# Patient Record
Sex: FEMALE | Race: WHITE | NOT HISPANIC OR LATINO | Employment: STUDENT | URBAN - METROPOLITAN AREA
[De-identification: names, ages, dates, MRNs, and addresses within clinical notes are randomized per-mention and may not be internally consistent; named-entity substitution may affect disease eponyms.]

---
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
## 2025-01-29 ENCOUNTER — ATHLETIC TRAINING (OUTPATIENT)
Dept: SPORTS MEDICINE | Facility: OTHER | Age: 20
End: 2025-01-29

## 2025-01-29 DIAGNOSIS — M25.562 ACUTE PAIN OF LEFT KNEE: Primary | ICD-10-CM

## 2025-01-29 NOTE — PROGRESS NOTES
"Athletic Training Daily Note    Name: Awilda Villatoro  Age: 19 y.o.     Visit Diagnosis: Acute pain of left knee [M25.562]    Subjective: Ath reports to [facility] for re-evaluation of left knee secondary to falling on it at practice last night. Ath states \"It is the most amount of pain she has ever been in\".    Objective: All ligament WNL, Gait is normal, no observable swelling.no pain with patellar motions, only pain is with quad MMT.    Treatment Log:     Rosalia     "

## 2025-01-30 ENCOUNTER — ATHLETIC TRAINING (OUTPATIENT)
Dept: SPORTS MEDICINE | Facility: OTHER | Age: 20
End: 2025-01-30

## 2025-01-30 DIAGNOSIS — M25.562 ACUTE PAIN OF LEFT KNEE: Primary | ICD-10-CM

## 2025-01-30 NOTE — PROGRESS NOTES
Pt reports today for rehab for knee pain. Pt states she is feeling okay and states she feels just some pain after walking all day.     Rehab today  -bike x5 minutes  -hip flexor stretch 3x30s  -monster walks red band x3  -hip dips on step  -forward lunge on step   -running progression    Ease into px today as  she demonstrated very little pain and  no antalgic gait with exercise. Report again tomorrow for rehab as needed

## 2025-02-17 ENCOUNTER — ATHLETIC TRAINING (OUTPATIENT)
Dept: SPORTS MEDICINE | Facility: OTHER | Age: 20
End: 2025-02-17

## 2025-02-17 DIAGNOSIS — G89.29 CHRONIC RIGHT-SIDED LOW BACK PAIN, UNSPECIFIED WHETHER SCIATICA PRESENT: Primary | ICD-10-CM

## 2025-02-17 DIAGNOSIS — M54.50 CHRONIC RIGHT-SIDED LOW BACK PAIN, UNSPECIFIED WHETHER SCIATICA PRESENT: Primary | ICD-10-CM

## 2025-02-17 NOTE — PROGRESS NOTES
Pt comes in today with chronic low back pain that began last year and has gone away. 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     Rehab today  -cupping and movement (cats cows, thread needle, beltran press with cable column)

## 2025-02-24 ENCOUNTER — ATHLETIC TRAINING (OUTPATIENT)
Dept: SPORTS MEDICINE | Facility: OTHER | Age: 20
End: 2025-02-24

## 2025-02-24 DIAGNOSIS — G89.29 CHRONIC RIGHT-SIDED LOW BACK PAIN, UNSPECIFIED WHETHER SCIATICA PRESENT: Primary | ICD-10-CM

## 2025-02-24 DIAGNOSIS — M54.50 CHRONIC RIGHT-SIDED LOW BACK PAIN, UNSPECIFIED WHETHER SCIATICA PRESENT: Primary | ICD-10-CM

## 2025-02-24 NOTE — PROGRESS NOTES
Athletic Training Daily Note    Name: Awilda Villatoro  Age: 19 y.o.     Visit Diagnosis: Chronic right-sided low back pain, unspecified whether sciatica present [M54.50, G89.29]    Subjective: Ath reports to [facility] for rehab. She reports R sided low back pain after px Saturday.     Objective: No palpable pain. No reports of sciatic symptoms. Previous cupping ania from over 1 week ago.     Treatment Log:   MHP 10min   Cupping for lower back both sides  Supine knee rocks  Cross over kick  Pretzel stretch   Cobra to child's pose    Cristi stretch   Muscle energy - R side post rotation - corrected and stayed after further stretching.   Ath was instructed to check in with ATC at px and px as tolerated today.

## 2025-02-26 ENCOUNTER — ATHLETIC TRAINING (OUTPATIENT)
Dept: SPORTS MEDICINE | Facility: OTHER | Age: 20
End: 2025-02-26

## 2025-02-26 DIAGNOSIS — G89.29 CHRONIC RIGHT-SIDED LOW BACK PAIN WITHOUT SCIATICA: Primary | ICD-10-CM

## 2025-02-26 DIAGNOSIS — M54.50 CHRONIC RIGHT-SIDED LOW BACK PAIN WITHOUT SCIATICA: Primary | ICD-10-CM

## 2025-02-26 NOTE — PROGRESS NOTES
Athletic Training Daily Note    Name: Awilda Villatoro  Age: 19 y.o.     Visit Diagnosis: Chronic right-sided low back pain without sciatica [M54.50, G89.29]    Subjective: Ath reports to [facility] for rehab. She reports continued low back pain.    Objective: Cupping marks still present.     Treatment Log:     [facility] 10min   Supine knee rocks  Cross over kick  Pretzel stretch   Cobra to child's pose    Cristi stretch   Muscle energy - R side post rotation - corrected and stayed after further stretching.   Dead bugs with ball between arms and legs 2*20  Glute bridge with ball squeeze between knees 2*20  Ath tolerated rehab well without pain. She was instructed to continue the stretches and muscle energy on her own before px.

## 2025-04-18 ENCOUNTER — APPOINTMENT (OUTPATIENT)
Dept: LAB | Facility: CLINIC | Age: 20
End: 2025-04-18
Attending: PEDIATRICS
Payer: COMMERCIAL

## 2025-04-18 DIAGNOSIS — R10.9 STOMACH ACHE: ICD-10-CM

## 2025-04-18 LAB
BASOPHILS # BLD AUTO: 0.08 THOUSANDS/ÂΜL (ref 0–0.1)
BASOPHILS NFR BLD AUTO: 1 % (ref 0–1)
CRP SERPL QL: <1 MG/L
EOSINOPHIL # BLD AUTO: 0.15 THOUSAND/ÂΜL (ref 0–0.61)
EOSINOPHIL NFR BLD AUTO: 1 % (ref 0–6)
ERYTHROCYTE [DISTWIDTH] IN BLOOD BY AUTOMATED COUNT: 11.9 % (ref 11.6–15.1)
HCT VFR BLD AUTO: 39.8 % (ref 34.8–46.1)
HGB BLD-MCNC: 13.1 G/DL (ref 11.5–15.4)
IMM GRANULOCYTES # BLD AUTO: 0.04 THOUSAND/UL (ref 0–0.2)
IMM GRANULOCYTES NFR BLD AUTO: 0 % (ref 0–2)
LIPASE SERPL-CCNC: 24 U/L (ref 11–82)
LYMPHOCYTES # BLD AUTO: 3.01 THOUSANDS/ÂΜL (ref 0.6–4.47)
LYMPHOCYTES NFR BLD AUTO: 23 % (ref 14–44)
MCH RBC QN AUTO: 29.4 PG (ref 26.8–34.3)
MCHC RBC AUTO-ENTMCNC: 32.9 G/DL (ref 31.4–37.4)
MCV RBC AUTO: 89 FL (ref 82–98)
MONOCYTES # BLD AUTO: 0.73 THOUSAND/ÂΜL (ref 0.17–1.22)
MONOCYTES NFR BLD AUTO: 6 % (ref 4–12)
NEUTROPHILS # BLD AUTO: 8.9 THOUSANDS/ÂΜL (ref 1.85–7.62)
NEUTS SEG NFR BLD AUTO: 69 % (ref 43–75)
NRBC BLD AUTO-RTO: 0 /100 WBCS
PLATELET # BLD AUTO: 396 THOUSANDS/UL (ref 149–390)
PMV BLD AUTO: 9.6 FL (ref 8.9–12.7)
RBC # BLD AUTO: 4.45 MILLION/UL (ref 3.81–5.12)
WBC # BLD AUTO: 12.91 THOUSAND/UL (ref 4.31–10.16)

## 2025-04-18 PROCEDURE — 36415 COLL VENOUS BLD VENIPUNCTURE: CPT

## 2025-04-18 PROCEDURE — 86140 C-REACTIVE PROTEIN: CPT

## 2025-04-18 PROCEDURE — 85025 COMPLETE CBC W/AUTO DIFF WBC: CPT

## 2025-04-18 PROCEDURE — 83690 ASSAY OF LIPASE: CPT

## 2025-07-29 ENCOUNTER — APPOINTMENT (OUTPATIENT)
Dept: LAB | Facility: CLINIC | Age: 20
End: 2025-07-29
Attending: PEDIATRICS
Payer: COMMERCIAL

## 2025-07-29 DIAGNOSIS — K30 INDIGESTION: ICD-10-CM

## 2025-07-29 LAB — IGA SERPL-MCNC: 95 MG/DL (ref 66–433)

## 2025-07-29 PROCEDURE — 82784 ASSAY IGA/IGD/IGG/IGM EACH: CPT

## 2025-07-29 PROCEDURE — 36415 COLL VENOUS BLD VENIPUNCTURE: CPT

## 2025-07-29 PROCEDURE — 82785 ASSAY OF IGE: CPT

## 2025-07-29 PROCEDURE — 86364 TISS TRNSGLTMNASE EA IG CLAS: CPT

## 2025-07-29 PROCEDURE — 86003 ALLG SPEC IGE CRUDE XTRC EA: CPT

## 2025-07-30 LAB
ALMOND IGE QN: <0.1 KUA/I (ref 0–0.1)
CASHEW NUT IGE QN: <0.1 KUA/I (ref 0–0.1)
CODFISH IGE QN: <0.1 KUA/I (ref 0–0.1)
EGG WHITE IGE QN: <0.1 KUA/I (ref 0–0.1)
GLUTEN IGE QN: <0.1 KUA/I (ref 0–0.1)
HAZELNUT IGE QN: <0.1 KUA/L (ref 0–0.1)
MILK IGE QN: <0.1 KUA/I (ref 0–0.1)
PEANUT IGE QN: <0.1 KUA/I (ref 0–0.1)
SALMON IGE QN: <0.1 KUA/I (ref 0–0.1)
SCALLOP IGE QN: <0.1 KUA/L (ref 0–0.1)
SESAME SEED IGE QN: <0.1 KUA/I (ref 0–0.1)
SHRIMP IGE QN: <0.1 KUA/L (ref 0–0.1)
SOYBEAN IGE QN: <0.1 KUA/I (ref 0–0.1)
TOTAL IGE SMQN RAST: 33.3 KU/L (ref 0–113)
TUNA IGE QN: <0.1 KUA/I (ref 0–0.1)
WALNUT IGE QN: <0.1 KUA/I (ref 0–0.1)
WHEAT IGE QN: <0.1 KUA/I (ref 0–0.1)

## 2025-08-01 LAB — TTG IGA SER IA-ACNC: <0.4 U/ML (ref ?–10)

## 2025-08-22 LAB — CALPROTECTIN STL-MCNC: 12.8 ÂΜG/G
